# Patient Record
Sex: FEMALE | Race: WHITE | NOT HISPANIC OR LATINO | Employment: FULL TIME | URBAN - METROPOLITAN AREA
[De-identification: names, ages, dates, MRNs, and addresses within clinical notes are randomized per-mention and may not be internally consistent; named-entity substitution may affect disease eponyms.]

---

## 2024-03-16 ENCOUNTER — HOSPITAL ENCOUNTER (EMERGENCY)
Facility: HOSPITAL | Age: 29
Discharge: HOME | End: 2024-03-16
Attending: EMERGENCY MEDICINE
Payer: COMMERCIAL

## 2024-03-16 VITALS
HEART RATE: 96 BPM | RESPIRATION RATE: 18 BRPM | OXYGEN SATURATION: 100 % | DIASTOLIC BLOOD PRESSURE: 64 MMHG | WEIGHT: 117 LBS | TEMPERATURE: 98 F | SYSTOLIC BLOOD PRESSURE: 101 MMHG

## 2024-03-16 DIAGNOSIS — S09.90XA CLOSED HEAD INJURY, INITIAL ENCOUNTER: Primary | ICD-10-CM

## 2024-03-16 LAB
EXT PREGNANCY TEST URINE: NEGATIVE
EXT. CONTROL: NORMAL
GLUCOSE SERPL-MCNC: 112 MG/DL (ref 65–140)

## 2024-03-16 PROCEDURE — 99285 EMERGENCY DEPT VISIT HI MDM: CPT | Performed by: EMERGENCY MEDICINE

## 2024-03-16 PROCEDURE — 82948 REAGENT STRIP/BLOOD GLUCOSE: CPT

## 2024-03-16 PROCEDURE — 99284 EMERGENCY DEPT VISIT MOD MDM: CPT

## 2024-03-16 PROCEDURE — 81025 URINE PREGNANCY TEST: CPT

## 2024-03-16 RX ORDER — ACETAMINOPHEN 325 MG/1
975 TABLET ORAL ONCE
Status: COMPLETED | OUTPATIENT
Start: 2024-03-16 | End: 2024-03-16

## 2024-03-16 RX ORDER — IBUPROFEN 400 MG/1
400 TABLET ORAL ONCE
Status: COMPLETED | OUTPATIENT
Start: 2024-03-16 | End: 2024-03-16

## 2024-03-16 RX ORDER — ONDANSETRON 4 MG/1
4 TABLET, ORALLY DISINTEGRATING ORAL ONCE
Status: COMPLETED | OUTPATIENT
Start: 2024-03-16 | End: 2024-03-16

## 2024-03-16 RX ADMIN — ONDANSETRON 4 MG: 4 TABLET, ORALLY DISINTEGRATING ORAL at 10:52

## 2024-03-16 RX ADMIN — IBUPROFEN 400 MG: 400 TABLET, FILM COATED ORAL at 10:52

## 2024-03-16 RX ADMIN — ACETAMINOPHEN 975 MG: 325 TABLET, FILM COATED ORAL at 10:51

## 2024-03-16 NOTE — Clinical Note
Enoc Hsieh was seen and treated in our emergency department on 3/16/2024.            as tolerated    Diagnosis: syncope    Enoc  may return to work on return date.    She may return on this date: 03/19/2024         If you have any questions or concerns, please don't hesitate to call.      Justo Mari, DO    ______________________________           _______________          _______________  Hospital Representative                              Date                                Time

## 2024-03-16 NOTE — ED ATTENDING ATTESTATION
3/16/2024  I, Ron Barakat MD, saw and evaluated the patient. I have discussed the patient with the resident/non-physician practitioner and agree with the resident's/non-physician practitioner's findings, Plan of Care, and MDM as documented in the resident's/non-physician practitioner's note, except where noted. All available labs and Radiology studies were reviewed.  I was present for key portions of any procedure(s) performed by the resident/non-physician practitioner and I was immediately available to provide assistance.       At this point I agree with the current assessment done in the Emergency Department.  I have conducted an independent evaluation of this patient a history and physical is as follows:  [facility]    she got lightheaded and felt everything going dark passed out fell hit forehead   No complaints of nausea vomiting diarrhea melena or bright red blood per rectum no complaints of abdominal pain , mild headache   left sided neck pain   no tongue bit no incontinence   No focal neurologic symptoms  No symptoms of anemia  EXAM:   Const:   GCS  15   well appearing   NAD     HEENT:  Cnt left forehead no bony deformity     tender bridge of nose no septal hematoma no deformity no crepitation  no trismus no mallocclusion    sclera anicteric conjunctiva pink   throat clear, MMM    Neck:   supple  no meningismus  no jvd   no bruits  no  midline tenderness  mild tender left paracervical     FROM   no pain with axial load   Lungs:   clear  CW non-tender   No creiptation  Heart:   RRR no m/g/r  Normal pulses  Abd:   soft nt nd pos bs   Ext:    normal nontender  No edema  Neruo:   CN 2 -12 intact  motor intact 5/5 sensory intact cerebellar intact       Gait normal    IMPRESSION: Syncope likely vasovagal   head injury     PLAN: EKG    ED Course         Critical Care Time  Procedures

## 2024-03-16 NOTE — ED PROVIDER NOTES
History  Chief Complaint   Patient presents with    Syncope     Pt was at the [facility] for a spa day pt felt dizzy while standing and fainted, pt fell forward and hit left side of face, with abrasions to forehead and cheek bone, c/o pain to head and back of neck, denies nausea, denies dizziness, numbness to left side of face and tip of nose      28-year-old female presents emergency department after having a syncopal event over at the local [facility].  Patient was with her friends to get a small when she felt her vision turned blurry, black with stars.  She remembers waking up on the floor.  She has some swelling over the left frontal region of her forehead as well as tenderness over the nasal bridge.  Patient admits she does not drink enough water.  Patient did not drink alcohol the previous day.  Patient admits to not drinking enough water.  She states she only drinks around 1 glass of water per day.  Drinks around 2 to 3 cups of coffee a day.  Denies any past medical history.  No previous episodes of syncope or seizure.        None       History reviewed. No pertinent past medical history.    History reviewed. No pertinent surgical history.    History reviewed. No pertinent family history.  I have reviewed and agree with the history as documented.    E-Cigarette/Vaping     E-Cigarette/Vaping Substances           Review of Systems   Neurological:  Positive for syncope and headaches.   All other systems reviewed and are negative.      Physical Exam  ED Triage Vitals [03/16/24 0947]   Temperature Pulse Respirations Blood Pressure SpO2   98 °F (36.7 °C) 91 20 101/64 100 %      Temp src Heart Rate Source Patient Position - Orthostatic VS BP Location FiO2 (%)   -- Monitor Lying Right arm --      Pain Score       6             Orthostatic Vital Signs  Vitals:    03/16/24 0947 03/16/24 1000   BP: 101/64 101/64   Pulse: 91 96   Patient Position - Orthostatic VS: Lying Lying       Physical Exam  Vitals and nursing note  reviewed.   Constitutional:       General: She is not in acute distress.     Appearance: She is well-developed.   HENT:      Head: Normocephalic and atraumatic.     Eyes:      Conjunctiva/sclera: Conjunctivae normal.   Cardiovascular:      Rate and Rhythm: Normal rate and regular rhythm.      Heart sounds: No murmur heard.  Pulmonary:      Effort: Pulmonary effort is normal. No respiratory distress.      Breath sounds: Normal breath sounds.   Abdominal:      Palpations: Abdomen is soft.      Tenderness: There is no abdominal tenderness.   Musculoskeletal:         General: No swelling.      Cervical back: Neck supple.      Right lower leg: No edema.      Left lower leg: No edema.   Skin:     General: Skin is warm and dry.      Capillary Refill: Capillary refill takes less than 2 seconds.      Findings: No lesion or rash.   Neurological:      General: No focal deficit present.      Mental Status: She is alert and oriented to person, place, and time. Mental status is at baseline.      Cranial Nerves: No cranial nerve deficit.      Sensory: No sensory deficit.      Motor: No weakness.      Coordination: Coordination normal.      Gait: Gait normal.   Psychiatric:         Mood and Affect: Mood normal.         ED Medications  Medications   acetaminophen (TYLENOL) tablet 975 mg (975 mg Oral Given 3/16/24 1051)   ibuprofen (MOTRIN) tablet 400 mg (400 mg Oral Given 3/16/24 1052)   ondansetron (ZOFRAN-ODT) dispersible tablet 4 mg (4 mg Oral Given 3/16/24 1052)       Diagnostic Studies  Results Reviewed       Procedure Component Value Units Date/Time    POCT pregnancy, urine [[id number]]  (Normal) Resulted: 03/16/24 1141    Lab Status: Final result Updated: 03/16/24 1141     EXT Preg Test, Ur Negative     Control Valid    Fingerstick Glucose (POCT) [[id number]]  (Normal) Collected: 03/16/24 1105    Lab Status: Final result Specimen: Blood Updated: 03/16/24 1106     POC Glucose 112 mg/dl                    No orders to display          Procedures  ECG 12 Lead Documentation Only    Date/Time: 3/16/2024 11:11 AM    Performed by: Justo Mari DO  Authorized by: Justo Mari DO    Indications / Diagnosis:  Syncope  ECG reviewed by me, the ED Provider: yes    Patient location:  ED  Previous ECG:     Previous ECG:  Unavailable    Comparison to cardiac monitor: Yes    Interpretation:     Interpretation: normal    Rate:     ECG rate:  63    ECG rate assessment: normal    Rhythm:     Rhythm: sinus rhythm    Ectopy:     Ectopy: none    QRS:     QRS axis:  Normal    QRS intervals:  Normal  Conduction:     Conduction: normal    ST segments:     ST segments:  Normal  T waves:     T waves: normal    Comments:      EKG does not suggest:  Ischemia (MAKAYLA, or DeWinters T waves).  Wellens (symetrically deeply inverted T waves in V2 and V3 or Biphasic T waves in V2, V3)  Heart block  ARVD (epsilon wave, large TWI, localized qrs widening of 110ms in V1-V3, paroxysmal episodes of VT, prolonged s wave upstroke in v1-v3)  HOCM (massive TWI, dagger Qs in lateral/inferior leads)  Arrhythmia  WPW (delta wave)  Long QT syndrome from drugs such as TCAs, fluconazole, Reglan, methadone, SSRIs etc  Short QT  Brugada (coved st elevation with TWI, saddleback MAKAYLA or a small amount of MAKAYLA)            ED Course  ED Course as of 03/16/24 1612   Sat Mar 16, 2024   1142 POCT pregnancy, urine  negative   1205 Patient reevaluated at this point time.  Mother was at bedside.  All questions were answered at this time.  Patient still complaining of headache.  Family asked whether or not to pursue CAT scan imaging.  They were explained the signs and symptoms of intracranial hemorrhage such as persistent nausea, vomiting, difficulty with walking, confusion, change in mental status, difficulty with breathing.  They were explained that the patient is not exhibiting the symptoms at this time and that the patient likely has a closed head injury or concussion.  They were offered CAT  scan at this point time however they politely declined.                             SBIRT 20yo+      Flowsheet Row Most Recent Value   Initial Alcohol Screen: US AUDIT-C     1. How often do you have a drink containing alcohol? 0 Filed at: 03/16/2024 0945   2. How many drinks containing alcohol do you have on a typical day you are drinking?  0 Filed at: 03/16/2024 0945   3a. Male UNDER 65: How often do you have five or more drinks on one occasion? 0 Filed at: 03/16/2024 0945   3b. FEMALE Any Age, or MALE 65+: How often do you have 4 or more drinks on one occassion? 0 Filed at: 03/16/2024 0945   Audit-C Score 0 Filed at: 03/16/2024 0945   DINORA: How many times in the past year have you...    Used an illegal drug or used a prescription medication for non-medical reasons? Never Filed at: 03/16/2024 0945                  Medical Decision Making      DDx: Vasovagal syncope, orthostatic syncope, doubt neurogenic syncope or cardiogenic syncope, possible dehydration, low concern for pregnancy or acute osseous injury    Plan: ECG, poc glucose, preg, pain control, ice pack    Patient was reevaluated numerous times.  Patient still complaining of 6 out of 10 headache.  Discussion with patient mother when she arrived.  Patient was offered CAT scan however they declined.  They are instructed to follow-up with PCP as well as concussion clinic.  They verbalized understanding.  Stable for discharge home.  ECG unremarkable.  Pregnancy negative.  Glucose within normal limits.  Patient likely experienced vasovagal syncope due to dehydration.  Patient drank a few cups of water while in the emergency department.      Amount and/or Complexity of Data Reviewed  Labs: ordered. Decision-making details documented in ED Course.    Risk  OTC drugs.  Prescription drug management.          Disposition  Final diagnoses:   Closed head injury, initial encounter     Time reflects when diagnosis was documented in both MDM as applicable and the  Disposition within this note       Time User Action Codes Description Comment    3/16/2024 12:07 PM Justo Mari Add [S09.90XA] Closed head injury, initial encounter           ED Disposition       ED Disposition   Discharge    Condition   Stable    Date/Time   Sat Mar 16, 2024 1215    Comment   Ivethtramaine Maria C Hsieh discharge to home/self care.                   Follow-up Information       Follow up With Specialties Details Why Contact Info Additional Information    Ning June DO Family Medicine   [address] [place] [address] DR Hossein RUIZ [address] [phone]       [facility] Emergency Department Emergency Medicine Go to  If symptoms worsen [address] [facility] [address]  [phone] [facility] Emergency Department, [address] [place], [place], [address]   [phone]    Physical Therapy at [facility]   [address] [facility] [address]  [phone] [facility] at [facility], [address] [place] [address], [address]   [phone]            There are no discharge medications for this patient.        PDMP Review       None             ED Provider  Attending physically available and evaluated Enoc Hsieh. I managed the patient along with the ED Attending.    Electronically Signed by           Justo Mari DO  03/16/24 8248

## 2024-03-16 NOTE — DISCHARGE INSTRUCTIONS
Ivethtramaine Mari aC Hsieh was seen and evaluated today in the emergency department over your concern of fall with head strike.  The workup that we performed showed vasovagal syncope.  Please return to the emergency department if you experience nausea, vomiting, altered mental status or any other signs and symptoms that may be concerning to you.  Please follow-up with your primary care doctor within 1 day.  All questions were answered prior to discharge.  Thank you for choosing [facility] for your care.    Follow-up with your primary care provider and concussion clinic